# Patient Record
Sex: FEMALE | Race: WHITE
[De-identification: names, ages, dates, MRNs, and addresses within clinical notes are randomized per-mention and may not be internally consistent; named-entity substitution may affect disease eponyms.]

---

## 2017-07-22 ENCOUNTER — HOSPITAL ENCOUNTER (EMERGENCY)
Dept: HOSPITAL 45 - C.EDB | Age: 22
Discharge: HOME | End: 2017-07-22
Payer: COMMERCIAL

## 2017-07-22 VITALS
BODY MASS INDEX: 29.35 KG/M2 | WEIGHT: 176.15 LBS | WEIGHT: 176.15 LBS | HEIGHT: 65 IN | BODY MASS INDEX: 29.35 KG/M2 | HEIGHT: 65 IN

## 2017-07-22 VITALS — DIASTOLIC BLOOD PRESSURE: 67 MMHG | SYSTOLIC BLOOD PRESSURE: 113 MMHG | HEART RATE: 103 BPM | OXYGEN SATURATION: 96 %

## 2017-07-22 VITALS — TEMPERATURE: 98.6 F

## 2017-07-22 DIAGNOSIS — Z87.59: ICD-10-CM

## 2017-07-22 DIAGNOSIS — Z84.1: ICD-10-CM

## 2017-07-22 DIAGNOSIS — Z83.3: ICD-10-CM

## 2017-07-22 DIAGNOSIS — N76.0: Primary | ICD-10-CM

## 2017-07-22 LAB
APPEARANCE UR: CLEAR
BILIRUB UR-MCNC: (no result) MG/DL
COLOR UR: YELLOW
MANUAL MICROSCOPIC REQUIRED?: NO
NITRITE UR QL STRIP: (no result)
PH UR STRIP: 8 [PH] (ref 4.5–7.5)
REVIEW REQ?: YES
SP GR UR STRIP: 1.03 (ref 1–1.03)
SULFASALICYLIC ACID: (no result)
URINE BILL WITH OR WITHOUT MIC: (no result)
URINE EPITHELIAL CELL AUTO: (no result) /LPF (ref 0–5)
UROBILINOGEN UR-MCNC: (no result) MG/DL
ZZUR CULT IF INDIC CLEAN CATCH: NO

## 2017-07-22 NOTE — EMERGENCY ROOM VISIT NOTE
History


Report prepared by Marzena:  Estrella Will


Under the Supervision of:  Dr. Aiyana Castro D.O.


First contact with patient:  17:20


Chief Complaint:  PELVIC  PAIN


Stated Complaint:  ABD PAIN, DIARRHEA, FEVER, CHILLS





History of Present Illness


The patient is a 22 year old female who presents to the Emergency Room with 

complaints of an episode of pelvic pain starting six days ago. The patient 

states that she has been having a severe watery vaginal discharge that has no 

smell. She states that it is so bad that she has to wear a maxi pad or she 

leaks onto her sheets. She states that she has a crampy period like pain with it

, but it feels worse. The patient reports that she was treated for a UTI and a 

yeast infection two weeks ago. She reports that she was tested two weeks ago 

for Gonorrhea and Chlamydia that came back negative, but has had two different 

new sexual partners since then. The patient states that a week ago she took 

Plan B and she notes that she bled for a day. The patient states that she is on 

Prednisone and Amoxicillin for a double ear infection and sinus infection that 

she started yesterday. The patient complains of fever, chills, and being 

bloated. She denies urinary symptoms, change in bowel movements, diarrhea, new 

soaps, new detergents, new condoms, and new lubricants. She notes that she has 

Placenta Accreta.





   Source of History:  patient


   Onset:  six days ago


   Position:  pelvis


   Symptom Intensity:  severe


   Quality:  other (crampy period like)


   Timing:  other (episode)


   Associated Symptoms:  + fevers, + chills, No diarrhea, No urinary symptoms


Note:


The patient complains of bloating. The patient denies a change in bowel 

movements, new soaps, new dtergents, new condoms, and new lubricants.





Review of Systems


Pt denies headache, change in vision, fevers, chest pain, shortness of breath, 

nausea, vomiting, diarrhea, pain with urination, and melena.





Past Medical & Surgical


Medical Problems:


(1) First trimester pregnancy


(2) Non-reactive NST (non-stress test)


(3) Placenta accreta


(4) Threatened miscarriage


(5) Uterine contractions








Family History





Cancer


Diabetes mellitus


Kidney disease


Kidney stones





Social History


Smoking Status:  Never Smoker


Alcohol Use:  none


Marital Status:  single


Housing Status:  lives alone


Occupation Status:  [employer] student





Current/Historical Medications


Scheduled


Amoxicillin (Amoxicillin), 500 MG PO BID


Metronidazole (Flagyl), 500 MG PO BID


Prednisone Tab (Prednisone), 10 MG PO BID


Sertraline (Zoloft), 75 MG PO DAILY





Allergies


Coded Allergies:  


     No Known Allergies (Unverified , 7/22/17)





Physical Exam


Vital Signs











  Date Time  Temp Pulse Resp B/P (MAP) Pulse Ox O2 Delivery O2 Flow Rate FiO2


 


7/22/17 20:34  103 18 113/67 96   


 


7/22/17 19:14  99 16 114/64 99 Room Air  


 


7/22/17 17:15 37.0 101 20 115/77 100 Room Air  











Physical Exam


GENERAL: alert, well appearing, well nourished, no distress, non-toxic 


EYE EXAM: normal conjunctiva, PERRL and EOM's grossly intact


OROPHARYNX: no exudate, no erythema, lips, buccal mucosa, and tongue normal and 

mucous membranes are moist


NECK: supple, no nuchal rigidity, no adenopathy, non-tender


LUNGS: Clear to auscultation. Normal chest wall mechanics


HEART: no murmurs, S1 normal and S2 normal 


ABDOMEN: abdomen soft, normo-active bowel sounds, no masses, no rebound or 

guarding, mild suprapubic discomfort.


BACK: Back is symmetrical on inspection and there is no deformity, no midline 

tenderness, no CVA tenderness. 


SKIN: no rashes and no bruising 


UPPER EXTREMITIES: upper extremities are grossly normal. 


LOWER EXTREMITIES: No pitting edema.


NEURO EXAM: Normal sensorium, cranial nerves II-XII [grossly] intact, normal 

speech,  no [gross] weakness of arms, no [gross] weakness of legs. [No drift. 

Finger to nose intact. Gross sensation intact.]





Medical Decision & Procedures


ER Provider


Diagnostic Interpretation:


Radiology results have been interpreted by the radiologist and reviewed by me.





PELVIC ULTRASOUND





CLINICAL HISTORY: Lower abdominal pain. Vaginal discharge.    





COMPARISON STUDY:  Pelvic ultrasound August 19, 2015 and MRI of the pelvis


October 15, 2015.





TECHNIQUE: Transabdominal and transvaginal sonography of the pelvis was


performed.





FINDINGS: The uterus measures 10.1 x 4.6 x 4.4 cm. The endometrium measures 1 cm


in thickness. A small amount of fluid is noted within the endometrial canal. The


right ovary measures 4.2 x 1.9 x 2.4 cm and the left measures 4.5 x 1.9 x 2.6


cm. There was color flow within each ovary. A small amount of fluid was noted


within the pelvis.





IMPRESSION:  





1. Unremarkable sonographic appearance of the ovaries.





2. Endometrial thickness of 1 cm. Small amount of fluid within the endometrial


and cervical canal.





3. Small amount of fluid within the pelvis.














Electronically signed by:  Charlie Gomez M.D.


7/22/2017 7:08 PM





Dictated Date/Time:  7/22/2017 7:05 PM





Laboratory Results











Test


  7/22/17


18:00 7/22/17


19:35


 


Urine Color YELLOW  


 


Urine Appearance CLEAR (CLEAR)  


 


Urine pH 8.0 (4.5-7.5)  


 


Urine Specific Gravity


  1.027


(1.000-1.030) 


 


 


Urine Protein NEG (NEG)  


 


Urine Glucose (UA) NEG (NEG)  


 


Urine Ketones TRACE (NEG)  


 


Urine Occult Blood NEG (NEG)  


 


Urine Nitrite NEG (NEG)  


 


Urine Bilirubin NEG (NEG)  


 


Urine Urobilinogen NEG (NEG)  


 


Urine Leukocyte Esterase TRACE (NEG)  


 


Urine WBC (Auto) 1-5 /hpf (0-5)  


 


Urine RBC (Auto) 0-4 /hpf (0-4)  


 


Urine Hyaline Casts (Auto) 1-5 /lpf (0-5)  


 


Urine Epithelial Cells (Auto)


  20-30 /lpf


(0-5) 


 


 


Urine Bacteria (Auto) NEG (NEG)  


 


Urine Pregnancy Test NEG (NEG)  





Laboratory results per my review.





Medications Administered











 Medications


  (Trade)  Dose


 Ordered  Sig/Rufino


 Route  Start Time


 Stop Time Status Last Admin


Dose Admin


 


 Acetaminophen


  (Tylenol Tab)  1,000 mg  NOW  STAT


 PO  7/22/17 19:38


 7/22/17 19:39 DC 7/22/17 19:44


1,000 MG


 


 Metronidazole


  (Flagyl Tab)  500 mg  NOW  STAT


 PO  7/22/17 20:16


 7/22/17 20:17 DC 7/22/17 20:30


500 MG











Procedure


PELVIC EXAM:





Normal external genitalia. No blood in vaginal vault. Moderate amount of clear 

vaginal discharge. No evidence of cervicitis. No CMT or adnexal tenderness.





ED Course


1739: The patient was evaluated in room A3. A complete history and physical 

exam was performed. 





1938: Ordered Tylenol Tab 1000 mg PO.





2016: Ordered Flagyl Tab 500 mg PO.





2017: Upon reevaluation, the patient is feeling better. I discussed the 

findings and the treatment plan with the patient.  She verbalizes agreement and 

understanding.  The patient was discharged home.





Medical Decision


Differential diagnoses include STD, PID, EV, TOA, hormonal abnormality, 

allergic reaction, vaginitis.





Pt well appearing here despite complaints.  Abd soft, no f/c.  UA negative.  GC/

Chlamydia sent, yeast pending but clinically not consistent with yeast 

infection.  Given discharge, possible BV.  Started on flagyl.  No evidence of 

PID, TOA.  Doubt additional GI pathology.  Doubt stone/pyelo.  Discussed f/u 

with ob/gyn, discussed sx to watch/return for, she verbalized understanding and 

was agreeable with plan.





Medication Reconcilliation


Current Medication List:  was personally reviewed by me





Blood Pressure Screening


Patient's blood pressure:  Normal blood pressure





Impression





 Primary Impression:  


 Vaginal discharge


 Additional Impression:  


 Bacterial vaginosis





Scribe Attestation


The scribe's documentation has been prepared under my direction and personally 

reviewed by me in its entirety. I confirm that the note above accurately 

reflects all work, treatment, procedures, and medical decision making performed 

by me.





Departure Information


Dispostion


Home / Self-Care





Prescriptions





Metronidazole (FLAGYL) 500 Mg Tab


500 MG PO BID, #14 TAB


   Prov: MatthewAiyana S., DO         7/22/17





Referrals


No Doctor, Assigned (PCP)





Forms


HOME CARE DOCUMENTATION FORM,                                                 

               IMPORTANT VISIT INFORMATION, WORK / SCHOOL INSTRUCTIONS





Patient Instructions


My [facility]





Additional Instructions





Please follow up with OB/GYN regarding her discharge.  Please take the 

antibiotics as prescribed.  Please avoid sexual intercourse until you are seen 

and cleared by OB/GYN.  Please drink plenty of water.  Do not douche.  If you 

develop any worsening discharge, develop pain, bleeding, fevers or chills, back 

pain, noticed a change in your urine or bowel movements, or you have any other 

new concerns, please return the emergency room.





Problem Qualifiers

## 2017-07-22 NOTE — DIAGNOSTIC IMAGING REPORT
PELVIC ULTRASOUND



CLINICAL HISTORY: Lower abdominal pain. Vaginal discharge.    



COMPARISON STUDY:  Pelvic ultrasound August 19, 2015 and MRI of the pelvis

October 15, 2015.



TECHNIQUE: Transabdominal and transvaginal sonography of the pelvis was

performed.



FINDINGS: The uterus measures 10.1 x 4.6 x 4.4 cm. The endometrium measures 1 cm

in thickness. A small amount of fluid is noted within the endometrial canal. The

right ovary measures 4.2 x 1.9 x 2.4 cm and the left measures 4.5 x 1.9 x 2.6

cm. There was color flow within each ovary. A small amount of fluid was noted

within the pelvis.



IMPRESSION:  



1. Unremarkable sonographic appearance of the ovaries.



2. Endometrial thickness of 1 cm. Small amount of fluid within the endometrial

and cervical canal.



3. Small amount of fluid within the pelvis.









Electronically signed by:  Charlie Gomez M.D.

7/22/2017 7:08 PM



Dictated Date/Time:  7/22/2017 7:05 PM

## 2017-07-24 ENCOUNTER — HOSPITAL ENCOUNTER (EMERGENCY)
Dept: HOSPITAL 45 - C.EDB | Age: 22
Discharge: HOME | End: 2017-07-24
Payer: COMMERCIAL

## 2017-07-24 VITALS
SYSTOLIC BLOOD PRESSURE: 109 MMHG | DIASTOLIC BLOOD PRESSURE: 71 MMHG | OXYGEN SATURATION: 96 % | TEMPERATURE: 99.5 F | HEART RATE: 100 BPM

## 2017-07-24 VITALS
WEIGHT: 176.15 LBS | BODY MASS INDEX: 29.35 KG/M2 | WEIGHT: 176.15 LBS | HEIGHT: 65 IN | BODY MASS INDEX: 29.35 KG/M2 | HEIGHT: 65 IN

## 2017-07-24 DIAGNOSIS — N73.9: Primary | ICD-10-CM

## 2017-07-24 DIAGNOSIS — Z84.1: ICD-10-CM

## 2017-07-24 DIAGNOSIS — Z79.899: ICD-10-CM

## 2017-07-24 DIAGNOSIS — Z83.3: ICD-10-CM

## 2017-07-24 DIAGNOSIS — Z80.9: ICD-10-CM

## 2017-07-24 LAB
ALP SERPL-CCNC: 51 U/L (ref 45–117)
ALT SERPL-CCNC: 33 U/L (ref 12–78)
ANION GAP SERPL CALC-SCNC: 5 MMOL/L (ref 3–11)
APPEARANCE UR: CLEAR
AST SERPL-CCNC: 22 U/L (ref 15–37)
BASOPHILS # BLD: 0.05 K/UL (ref 0–0.2)
BASOPHILS NFR BLD: 0.8 %
BILIRUB UR-MCNC: (no result) MG/DL
BUN SERPL-MCNC: 9 MG/DL (ref 7–18)
BUN/CREAT SERPL: 10.4 (ref 10–20)
CALCIUM SERPL-MCNC: 9.3 MG/DL (ref 8.5–10.1)
CHLORIDE SERPL-SCNC: 102 MMOL/L (ref 98–107)
CO2 SERPL-SCNC: 30 MMOL/L (ref 21–32)
COLOR UR: (no result)
COMPLETE: YES
CREAT CL PREDICTED SERPL C-G-VRATE: 105.9 ML/MIN
CREAT SERPL-MCNC: 0.87 MG/DL (ref 0.6–1.2)
EOSINOPHIL NFR BLD AUTO: 184 K/UL (ref 130–400)
GLUCOSE SERPL-MCNC: 76 MG/DL (ref 70–99)
HCT VFR BLD CALC: 40.7 % (ref 37–47)
IG%: 0.3 %
IMM GRANULOCYTES NFR BLD AUTO: 22.6 %
LYMPHOCYTES # BLD: 1.41 K/UL (ref 1.2–3.4)
MANUAL MICROSCOPIC REQUIRED?: NO
MCH RBC QN AUTO: 28.3 PG (ref 25–34)
MCHC RBC AUTO-ENTMCNC: 33.4 G/DL (ref 32–36)
MCV RBC AUTO: 84.6 FL (ref 80–100)
MONOCYTES NFR BLD: 9.3 %
NEUTROPHILS # BLD AUTO: 0.2 %
NEUTROPHILS NFR BLD AUTO: 66.8 %
NITRITE UR QL STRIP: (no result)
PH UR STRIP: 6.5 [PH] (ref 4.5–7.5)
PMV BLD AUTO: 8.7 FL (ref 7.4–10.4)
POTASSIUM SERPL-SCNC: 4 MMOL/L (ref 3.5–5.1)
PREG INTERNAL NEGATIVE QC: (no result)
PREG INTERNAL POSITIVE QC: (no result)
RBC # BLD AUTO: 4.81 M/UL (ref 4.2–5.4)
REVIEW REQ?: NO
SODIUM SERPL-SCNC: 137 MMOL/L (ref 136–145)
SP GR UR STRIP: 1.03 (ref 1–1.03)
URINE BILL WITH OR WITHOUT MIC: (no result)
URINE EPITHELIAL CELL AUTO: >30 /LPF (ref 0–5)
UROBILINOGEN UR-MCNC: (no result) MG/DL
WBC # BLD AUTO: 6.23 K/UL (ref 4.8–10.8)

## 2017-07-24 NOTE — EMERGENCY ROOM VISIT NOTE
History


Report prepared by Marzena:  Shakira Garza


Under the Supervision of:  Dr. Wilder Fowler M.D.


First contact with patient:  11:07


Chief Complaint:  PELVIC  PAIN


Stated Complaint:  PELVIC PAIN,FEVER,DISCHARGE





History of Present Illness


The patient is a 22 year old female who presents to the Emergency Room with 

complaints of worsening pelvic pain for the past week. She has had abnormal 

vaginal discharge. The patient was evaluated in the ED two days ago for these 

symptoms and diagnosed with bacterial vaginosis. She was discharged on Flagyl 

and with instructions to follow-up with her ob-gyn. The patient states that she 

was unable to be seen by her ob-gyn until October, and she is still 

experiencing the same symptoms. Now she is also having diarrhea and abdominal 

bloating. She has been taking Tylenol for pain. The patient rates her current 

pain as a 2/10 in severity. She is currently taking Amoxicillin and prednisone 

for a sinus infection and bilateral ear infection.





   Source of History:  patient


   Onset:  1 week ago


   Position:  pelvis


   Symptom Intensity:  2/10


   Quality:  other (bloating)


   Timing:  worsening


   Associated Symptoms:  + diarrhea


Note:


Pt has abnormal vaginal discharge.





Review of Systems


See HPI for pertinent positives & negatives. A total of 10 systems reviewed and 

were otherwise negative.





Past Medical & Surgical


Medical Problems:


(1) First trimester pregnancy


(2) Non-reactive NST (non-stress test)


(3) Placenta accreta


(4) Threatened miscarriage


(5) Uterine contractions








Family History





Cancer


Diabetes mellitus


Kidney disease


Kidney stones





Social History


Smoking Status:  Never Smoker


Alcohol Use:  none


Marital Status:  single


Housing Status:  lives alone


Occupation Status:  [employer] student





Current/Historical Medications


Scheduled


Amoxicillin (Amoxicillin), 500 MG PO BID


Doxycycline Monohydrate (Monodox), 100 MG PO BID


Metronidazole (Flagyl), 500 MG PO BID


Prednisone Tab (Prednisone), 10 MG PO BID


Sertraline (Zoloft), 75 MG PO DAILY





Allergies


Coded Allergies:  


     No Known Allergies (Unverified , 7/22/17)





Physical Exam


Vital Signs











  Date Time  Temp Pulse Resp B/P (MAP) Pulse Ox O2 Delivery O2 Flow Rate FiO2


 


7/24/17 14:23 37.5 100 18 109/71 96   


 


7/24/17 12:13  88 18 91/46 96 Room Air  


 


7/24/17 12:05  90      


 


7/24/17 10:31 37.6 102 18 114/77 97 Room Air  











Physical Exam


GENERAL: Patient is a healthy-appearing well-nourished 22 year old female


HEAD: Normocephalic atraumatic


EYES: Ocular movements intact pupils equal and react to light


OROPHARYNX mucous membranes are moist no exudates present no erythema or edema 

present


NECK: Supple no nuchal rigidity


CHEST: Good equal expansion


LUNGS: Clear and equal to auscultation


CARDIAC: Normal S1 and S2


ABDOMEN: Soft nontender no guarding


BACK: No CVA tenderness


EXTREMITIES: No pain upon palpation normal muscle strength in all groups no 

clubbing cyanosis or edema


NEURO: Patient is following commands and answering questions appropriately. 

Alert and oriented x3 Cranial Nerves 2-12 grossly intact





Medical Decision & Procedures


ER Provider


Diagnostic Interpretation:


Radiology results as stated below per my review and radiologist interpretation:





ABD/PELVIS IV CONTRAST ONLY





HISTORY:  22 years-old Female Pt c/o diarrhea, LLQ abd pain and fever 





COMPARISON: Pelvic ultrasound 7/22/2017, pelvic MR 10/15/2015





TECHNIQUE: Multiple axial CT images of the abdomen and pelvis were obtained


following the intravenous administration of 93 mL Optiray 320. A dose lowering


technique was used consistent with the principals of NELSON.





FINDINGS: 


There is minimal dependent bibasilar atelectasis. There is no pneumoperitoneum


identified. Image inferior cardiac chambers are unremarkable.





The liver, spleen, gallbladder, pancreas and adrenal glands appear normal. The


bilateral kidneys and ureters are also within normal limits. Urinary bladder is


mostly collapsed. There is a moderate amount of free fluid within the pelvis


with fluid also present within the endocervical canal. There is increased


enhancement of the endometrium with prominence of the uterine vessels.


Peripheral enhancing cystic structure of the left adnexum, 2.0 x 1.8 cm


suggesting bleeding follicle.





The abdominal aorta is normal both course and caliber. There are a few scattered


prominent nonenlarged retroperitoneal and iliac lymph nodes with index pericaval


lymph node measuring up to 1.5 x 0.8 cm on image 201 of the axial series. These


are likely reactive.





There is no bowel obstruction. There are several loops of small bowel which


demonstrate air-fluid levels without dilation. The rectosigmoid is fluid-filled.


Fluid is also seen throughout several other loops of colon. The appendix appears


noninflamed within the abdominal right lower quadrant, however is also


fluid-filled likely extension of the colonic findings as described.





Mild stranding is seen within the lower pelvis. Soft tissues are unremarkable.


The bones appear intact.





IMPRESSION: 


1. Nondilated fluid-filled loops of both small and large bowel are noted


suggesting enteritis with diarrheal state.


2. Moderate amount of fluid within the endocervical canal with endometrial


enhancement and moderate amount of free pelvic fluid are nonspecific findings.


No evidence of drainable abscess. Correlate clinically to exclude pelvic


inflammatory disease. 


3. Peripherally enhancing cystic structure of the left adnexum, 2.0 cm suggests


including follicle.


4. Mildly prominent periaortic, pericaval and iliac chain lymph nodes are likely


reactive.





The above report was generated using voice recognition software. It may contain


grammatical, syntax or spelling errors.











Electronically signed by:  Marc Dickens M.D.


7/24/2017 12:58 PM





Dictated Date/Time:  7/24/2017 12:50 PM





Laboratory Results


7/24/17 11:20








Red Blood Count 4.81, Mean Corpuscular Volume 84.6, Mean Corpuscular Hemoglobin 

28.3, Mean Corpuscular Hemoglobin Concent 33.4, Mean Platelet Volume 8.7, 

Neutrophils (%) (Auto) 66.8, Lymphocytes (%) (Auto) 22.6, Monocytes (%) (Auto) 

9.3, Eosinophils (%) (Auto) 0.2, Basophils (%) (Auto) 0.8, Neutrophils # (Auto) 

4.16, Lymphocytes # (Auto) 1.41, Monocytes # (Auto) 0.58, Eosinophils # (Auto) 

0.01, Basophils # (Auto) 0.05





7/24/17 11:20

















Test


  7/24/17


11:10 7/24/17


11:20


 


Urine Color DK YELLOW  


 


Urine Appearance CLEAR (CLEAR)  


 


Urine pH 6.5 (4.5-7.5)  


 


Urine Specific Gravity


  1.026


(1.000-1.030) 


 


 


Urine Protein 2+ (NEG)  


 


Urine Glucose (UA) NEG (NEG)  


 


Urine Ketones TRACE (NEG)  


 


Urine Occult Blood NEG (NEG)  


 


Urine Nitrite NEG (NEG)  


 


Urine Bilirubin NEG (NEG)  


 


Urine Urobilinogen NEG (NEG)  


 


Urine Leukocyte Esterase SMALL (NEG)  


 


Urine WBC (Auto)


  5-10 /hpf


(0-5) 


 


 


Urine RBC (Auto)


  5-10 /hpf


(0-4) 


 


 


Urine Hyaline Casts (Auto) 1-5 /lpf (0-5)  


 


Urine Epithelial Cells (Auto) >30 /lpf (0-5)  


 


Urine Bacteria (Auto) NEG (NEG)  


 


White Blood Count


  


  6.23 K/uL


(4.8-10.8)


 


Red Blood Count


  


  4.81 M/uL


(4.2-5.4)


 


Hemoglobin


  


  13.6 g/dL


(12.0-16.0)


 


Hematocrit  40.7 % (37-47) 


 


Mean Corpuscular Volume


  


  84.6 fL


()


 


Mean Corpuscular Hemoglobin


  


  28.3 pg


(25-34)


 


Mean Corpuscular Hemoglobin


Concent 


  33.4 g/dl


(32-36)


 


Platelet Count


  


  184 K/uL


(130-400)


 


Mean Platelet Volume


  


  8.7 fL


(7.4-10.4)


 


Neutrophils (%) (Auto)  66.8 % 


 


Lymphocytes (%) (Auto)  22.6 % 


 


Monocytes (%) (Auto)  9.3 % 


 


Eosinophils (%) (Auto)  0.2 % 


 


Basophils (%) (Auto)  0.8 % 


 


Neutrophils # (Auto)


  


  4.16 K/uL


(1.4-6.5)


 


Lymphocytes # (Auto)


  


  1.41 K/uL


(1.2-3.4)


 


Monocytes # (Auto)


  


  0.58 K/uL


(0.11-0.59)


 


Eosinophils # (Auto)


  


  0.01 K/uL


(0-0.5)


 


Basophils # (Auto)


  


  0.05 K/uL


(0-0.2)


 


RDW Standard Deviation


  


  40.7 fL


(36.4-46.3)


 


RDW Coefficient of Variation


  


  13.1 %


(11.5-14.5)


 


Immature Granulocyte % (Auto)  0.3 % 


 


Immature Granulocyte # (Auto)


  


  0.02 K/uL


(0.00-0.02)


 


Anion Gap


  


  5.0 mmol/L


(3-11)


 


Est Creatinine Clear Calc


Drug Dose 


  105.9 ml/min 


 


 


Estimated GFR (


American) 


  109.6 


 


 


Estimated GFR (Non-


American 


  94.6 


 


 


BUN/Creatinine Ratio  10.4 (10-20) 


 


Calcium Level


  


  9.3 mg/dl


(8.5-10.1)


 


Total Bilirubin


  


  0.2 mg/dl


(0.2-1)


 


Direct Bilirubin


  


  < 0.1 mg/dl


(0-0.2)


 


Aspartate Amino Transf


(AST/SGOT) 


  22 U/L (15-37) 


 


 


Alanine Aminotransferase


(ALT/SGPT) 


  33 U/L (12-78) 


 


 


Alkaline Phosphatase


  


  51 U/L


()


 


Total Protein


  


  7.0 gm/dl


(6.4-8.2)


 


Albumin


  


  3.1 gm/dl


(3.4-5.0)


 


Lipase


  


  171 U/L


()


 


Human Chorionic Gonadotropin,


Qual 


  NEG (NEG) 


 














 Date/Time


Source Procedure


Growth Status


 


 


 7/24/17 13:20


Stool C.difficile Toxin B Gene (PCR) - Final


No C. difficile toxin B gene detected Complete





Labs reviewed by ED physician.





Medications Administered











 Medications


  (Trade)  Dose


 Ordered  Sig/Rufino


 Route  Start Time


 Stop Time Status Last Admin


Dose Admin


 


 Ceftriaxone Sodium


  (Rocephin Inj)  1 gm  NOW  STAT


 IV  7/24/17 11:16


 7/24/17 11:20 DC 7/24/17 11:38


1 GM


 


 Azithromycin


  (Zithromax Tab)  1,000 mg  NOW  ONCE


 PO  7/24/17 11:30


 7/24/17 11:31 DC 7/24/17 11:38


1,000 MG


 


 Sodium Chloride  1,000 ml @ 


 999 mls/hr  Q1H1M STAT


 IV  7/24/17 11:16


 7/24/17 12:16 DC 7/24/17 11:31


999 MLS/HR


 


 Doxycycline


 Hyclate


  (Vibramycin Cap)  100 mg  ONE  ONCE


 PO  7/24/17 11:30


 7/24/17 11:31 DC 7/24/17 11:38


100 MG


 


 Metoclopramide HCl


  (Reglan Inj)  10 mg  NOW  STAT


 IV  7/24/17 11:16


 7/24/17 11:20 DC 7/24/17 11:38


10 MG











ED Course


1107: Past medical records reviewed. The patient was evaluated in room C9. A 

complete history and physical examination was performed. 





1116: Reglan 10 mg IV, Dilaudid 1 mg IV (pt refused), Toradol 30 mg IV (pt 

refused), NSS 1000 ml @ 999 mls/hr IV, Rocephin 1 gm IV





1130: Vibramycin 100 mg PO, Azithromycin 1000 mg PO 





1328: I updated the patient on her results. 





1418: I reassessed the patient at this time. She is feeling better and resting 

comfortably. I discussed the results and treatment plan with the patient. I 

answered all pertaining questions that she had. She expressed understanding and 

verbalized agreement. The patient will be discharged home. 





1508: I discussed the patient's case with Dr. Santana of [facility] ob-gyn. 

She will follow-up with the patient in the office.





Medical Decision


Etiologies such as appendicitis, diverticulitis, PUD, biliary pathology, UTI, 

pancreatitis, obstruction, mesenteric ischemia, aortic pathology, infections, 

inflammatory bowel disease, renal colic, as well as others were entertained. 





This is a 22-year-old female that presents emergency department complaining of 

a large amount of fluid leaking from her vaginal vault.  I will note that the 

patient recently had a pelvic exam performed here in emergency department and I 

do believe based on these findings at the patient most likely has PID.  She was 

started on Rocephin as well as azithromycin in the emergency department.  I 

also started the patient on doxycycline.  I also recommended that the patient 

continue her Flagyl.  Her GC and chlamydia tests are still not returned.  

Repeat examination revealed much improvement the patient's symptoms.  The 

patient at this point wished to be discharged home however stressed the need 

for follow-up with gynecology.  Patient was in agreement with the treatment 

plan.





Medication Reconcilliation


Current Medication List:  was personally reviewed by me





Blood Pressure Screening


Patient's blood pressure:  Normal blood pressure





Consults


Time Called:  1300


Consulting Physician:  Dr. Santana


Returned Call:  1500


I discussed the patient's case with Dr. Santana of [facility] ob-gyn. She 

will follow-up with the patient in the office.





Impression





 Primary Impression:  


 PID (acute pelvic inflammatory disease)





Scribe Attestation


The scribe's documentation has been prepared under my direction and personally 

reviewed by me in its entirety. I confirm that the note above accurately 

reflects all work, treatment, procedures, and medical decision making performed 

by me.





Departure Information


Dispostion


Home / Self-Care





Prescriptions





Doxycycline Monohydrate (Monodox) 100 Mg Cap


100 MG PO BID, #14 CAP


   Prov: Wilder Fowler MD         7/24/17





Referrals


Blanca Le PA-C (PCP)








Agustín Chacon M.D.





Forms


HOME CARE DOCUMENTATION FORM,                                                 

               IMPORTANT VISIT INFORMATION, WORK / SCHOOL INSTRUCTIONS





Patient Instructions


My [facility], PID Tx Meds





Additional Instructions





STOP taking Amoxicillin, STOP taking Prednisone


START taking Doxycycline


CONTINUE taking FLAGYL





TAKE Probiotics to avoid diarrhea


Culture results are usually available in approx 48 hours








You have been examined and treated today on an emergency basis only. This is 

not a substitute for, or an effort to provide, complete comprehensive medical 

care. It is impossible to recognize and treat all injuries or illnesses in a 

single emergency department visit. It is therefore important that you follow up 

closely with your PCP.  Call as soon as possible for an appointment.  





Thank you for your time and consideration.  I look forward to speaking with you 

again soon.  Please don't hesitate to call us if you have any questions.

## 2017-07-24 NOTE — DIAGNOSTIC IMAGING REPORT
ABD/PELVIS IV CONTRAST ONLY



HISTORY:  22 years-old Female Pt c/o diarrhea, LLQ abd pain and fever 



COMPARISON: Pelvic ultrasound 7/22/2017, pelvic MR 10/15/2015



TECHNIQUE: Multiple axial CT images of the abdomen and pelvis were obtained

following the intravenous administration of 93 mL Optiray 320. A dose lowering

technique was used consistent with the principals of NELSON.



FINDINGS: 

There is minimal dependent bibasilar atelectasis. There is no pneumoperitoneum

identified. Image inferior cardiac chambers are unremarkable.



The liver, spleen, gallbladder, pancreas and adrenal glands appear normal. The

bilateral kidneys and ureters are also within normal limits. Urinary bladder is

mostly collapsed. There is a moderate amount of free fluid within the pelvis

with fluid also present within the endocervical canal. There is increased

enhancement of the endometrium with prominence of the uterine vessels.

Peripheral enhancing cystic structure of the left adnexum, 2.0 x 1.8 cm

suggesting bleeding follicle.



The abdominal aorta is normal both course and caliber. There are a few scattered

prominent nonenlarged retroperitoneal and iliac lymph nodes with index pericaval

lymph node measuring up to 1.5 x 0.8 cm on image 201 of the axial series. These

are likely reactive.



There is no bowel obstruction. There are several loops of small bowel which

demonstrate air-fluid levels without dilation. The rectosigmoid is fluid-filled.

Fluid is also seen throughout several other loops of colon. The appendix appears

noninflamed within the abdominal right lower quadrant, however is also

fluid-filled likely extension of the colonic findings as described.



Mild stranding is seen within the lower pelvis. Soft tissues are unremarkable.

The bones appear intact.



IMPRESSION: 

1. Nondilated fluid-filled loops of both small and large bowel are noted

suggesting enteritis with diarrheal state.

2. Moderate amount of fluid within the endocervical canal with endometrial

enhancement and moderate amount of free pelvic fluid are nonspecific findings.

No evidence of drainable abscess. Correlate clinically to exclude pelvic

inflammatory disease. 

3. Peripherally enhancing cystic structure of the left adnexum, 2.0 cm suggests

including follicle.

4. Mildly prominent periaortic, pericaval and iliac chain lymph nodes are likely

reactive.



The above report was generated using voice recognition software. It may contain

grammatical, syntax or spelling errors.







Electronically signed by:  Marc Dickens M.D.

7/24/2017 12:58 PM



Dictated Date/Time:  7/24/2017 12:50 PM

## 2017-07-26 LAB
CHLAMYDIA TRACH RNA***: NOT DETECTED
GC (NEIS GONORRHOEAE)RNA**: NOT DETECTED

## 2017-10-10 ENCOUNTER — HOSPITAL ENCOUNTER (EMERGENCY)
Dept: HOSPITAL 45 - C.EDB | Age: 22
Discharge: HOME | End: 2017-10-10
Payer: COMMERCIAL

## 2017-10-10 VITALS — OXYGEN SATURATION: 98 % | SYSTOLIC BLOOD PRESSURE: 124 MMHG | DIASTOLIC BLOOD PRESSURE: 76 MMHG | HEART RATE: 76 BPM

## 2017-10-10 VITALS
HEIGHT: 65 IN | HEIGHT: 65 IN | BODY MASS INDEX: 29.72 KG/M2 | BODY MASS INDEX: 29.72 KG/M2 | WEIGHT: 178.35 LBS | WEIGHT: 178.35 LBS

## 2017-10-10 VITALS — TEMPERATURE: 98.24 F

## 2017-10-10 DIAGNOSIS — H57.9: ICD-10-CM

## 2017-10-10 DIAGNOSIS — R51: Primary | ICD-10-CM

## 2017-10-10 DIAGNOSIS — E21.3: ICD-10-CM

## 2017-10-10 DIAGNOSIS — H57.12: ICD-10-CM
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
## 2018-05-14 ENCOUNTER — HOSPITAL ENCOUNTER (OUTPATIENT)
Dept: HOSPITAL 45 - C.LABSPEC | Age: 23
Discharge: HOME | End: 2018-05-14
Attending: OBSTETRICS & GYNECOLOGY
Payer: COMMERCIAL

## 2018-05-14 ENCOUNTER — HOSPITAL ENCOUNTER (OUTPATIENT)
Dept: HOSPITAL 45 - C.LAB1850 | Age: 23
Discharge: HOME | End: 2018-05-14
Attending: OBSTETRICS & GYNECOLOGY
Payer: COMMERCIAL

## 2018-05-14 DIAGNOSIS — N91.2: Primary | ICD-10-CM

## 2018-05-14 DIAGNOSIS — N90.89: Primary | ICD-10-CM
